# Patient Record
Sex: MALE | URBAN - METROPOLITAN AREA
[De-identification: names, ages, dates, MRNs, and addresses within clinical notes are randomized per-mention and may not be internally consistent; named-entity substitution may affect disease eponyms.]

---

## 2022-06-28 ENCOUNTER — TELEPHONE (OUTPATIENT)
Dept: OTOLARYNGOLOGY | Facility: CLINIC | Age: 67
End: 2022-06-28

## 2022-06-28 RX ORDER — AZELASTINE 1 MG/ML
2 SPRAY, METERED NASAL 2 TIMES DAILY
Qty: 30 ML | Refills: 0 | Status: SHIPPED | OUTPATIENT
Start: 2022-06-28 | End: 2022-06-29

## 2022-06-29 RX ORDER — AZELASTINE 1 MG/ML
2 SPRAY, METERED NASAL 2 TIMES DAILY
Qty: 30 ML | Refills: 0 | Status: SHIPPED | OUTPATIENT
Start: 2022-06-29

## 2022-06-29 NOTE — TELEPHONE ENCOUNTER
Per pt scheduled appt, is aware this is last refill, states pharmacy has not received prescription. Please advise thank you.

## 2022-07-14 ENCOUNTER — OFFICE VISIT (OUTPATIENT)
Dept: OTOLARYNGOLOGY | Facility: CLINIC | Age: 67
End: 2022-07-14
Payer: MEDICARE

## 2022-07-14 VITALS — HEIGHT: 71 IN | BODY MASS INDEX: 30.38 KG/M2 | WEIGHT: 217 LBS

## 2022-07-14 DIAGNOSIS — J34.3 NASAL TURBINATE HYPERTROPHY: ICD-10-CM

## 2022-07-14 DIAGNOSIS — J34.89 NASAL CRUSTING: Primary | ICD-10-CM

## 2022-07-14 DIAGNOSIS — G47.33 OBSTRUCTIVE SLEEP APNEA: ICD-10-CM

## 2022-07-14 PROCEDURE — 99203 OFFICE O/P NEW LOW 30 MIN: CPT | Performed by: SPECIALIST

## 2022-07-14 RX ORDER — AMLODIPINE BESYLATE 10 MG/1
10 TABLET ORAL DAILY
COMMUNITY

## 2022-07-14 RX ORDER — CARVEDILOL 25 MG/1
25 TABLET ORAL 2 TIMES DAILY WITH MEALS
COMMUNITY

## 2022-07-14 RX ORDER — DOXAZOSIN MESYLATE 4 MG/1
4 TABLET ORAL NIGHTLY
COMMUNITY

## 2022-07-14 RX ORDER — PANTOPRAZOLE SODIUM 40 MG/1
40 TABLET, DELAYED RELEASE ORAL
COMMUNITY

## 2022-07-14 RX ORDER — DOXYCYCLINE HYCLATE 100 MG/1
100 CAPSULE ORAL 2 TIMES DAILY
Qty: 28 CAPSULE | Refills: 0 | Status: SHIPPED | OUTPATIENT
Start: 2022-07-14

## 2022-07-14 RX ORDER — ESCITALOPRAM OXALATE 10 MG/1
10 TABLET ORAL DAILY
COMMUNITY

## 2022-07-14 RX ORDER — IMIPRAMINE HCL 25 MG
25 TABLET ORAL NIGHTLY
COMMUNITY

## 2022-07-14 RX ORDER — VALSARTAN 320 MG/1
320 TABLET ORAL DAILY
COMMUNITY

## 2022-07-14 RX ORDER — FESOTERODINE FUMARATE 8 MG/1
8 TABLET, FILM COATED, EXTENDED RELEASE ORAL
COMMUNITY

## 2022-07-14 NOTE — PATIENT INSTRUCTIONS
You had bilateral nasal crusting. Placed you on a trial of doxycycline. Follow-up in 2 weeks time, sooner if problems. Continue the Astelin nasal spray.

## 2022-07-28 ENCOUNTER — OFFICE VISIT (OUTPATIENT)
Dept: OTOLARYNGOLOGY | Facility: CLINIC | Age: 67
End: 2022-07-28
Payer: MEDICARE

## 2022-07-28 VITALS — WEIGHT: 216 LBS | BODY MASS INDEX: 30.24 KG/M2 | HEIGHT: 71 IN

## 2022-07-28 DIAGNOSIS — J34.89 NASAL CRUSTING: Primary | ICD-10-CM

## 2022-07-28 DIAGNOSIS — G47.33 OBSTRUCTIVE SLEEP APNEA: ICD-10-CM

## 2022-07-28 DIAGNOSIS — J34.3 NASAL TURBINATE HYPERTROPHY: ICD-10-CM

## 2022-07-28 PROCEDURE — 99213 OFFICE O/P EST LOW 20 MIN: CPT | Performed by: SPECIALIST

## 2022-07-28 RX ORDER — SOLIFENACIN SUCCINATE 10 MG/1
TABLET, FILM COATED ORAL
COMMUNITY
Start: 2022-05-09

## 2022-07-28 RX ORDER — ESCITALOPRAM OXALATE 5 MG/1
5 TABLET ORAL AS DIRECTED
COMMUNITY

## 2022-07-28 RX ORDER — ASPIRIN 81 MG/1
81 TABLET ORAL DAILY
COMMUNITY

## 2022-07-28 RX ORDER — CARVEDILOL 25 MG/1
12.5 TABLET ORAL 2 TIMES DAILY
COMMUNITY
Start: 2021-08-16

## 2022-07-28 RX ORDER — SODIUM BICARBONATE 325 MG/1
TABLET ORAL AS DIRECTED
COMMUNITY

## 2022-07-28 NOTE — PATIENT INSTRUCTIONS
There infection after the doxycycline. New Astelin nasal spray for nasal congestion. Okay to try dental splint for obstructive sleep apnea.

## 2022-08-11 ENCOUNTER — TELEPHONE (OUTPATIENT)
Dept: OTOLARYNGOLOGY | Facility: CLINIC | Age: 67
End: 2022-08-11

## 2022-08-11 RX ORDER — AZELASTINE 1 MG/ML
2 SPRAY, METERED NASAL 2 TIMES DAILY
Qty: 30 ML | Refills: 5 | Status: SHIPPED | OUTPATIENT
Start: 2022-08-11

## 2022-10-28 ENCOUNTER — OFFICE VISIT (OUTPATIENT)
Dept: OTOLARYNGOLOGY | Facility: CLINIC | Age: 67
End: 2022-10-28
Payer: MEDICARE

## 2022-10-28 VITALS — BODY MASS INDEX: 30.24 KG/M2 | HEIGHT: 71 IN | WEIGHT: 216 LBS

## 2022-10-28 DIAGNOSIS — J34.89 NASAL CRUSTING: Primary | ICD-10-CM

## 2022-10-28 DIAGNOSIS — R09.82 POSTNASAL DRIP: ICD-10-CM

## 2022-10-28 DIAGNOSIS — B37.81 ESOPHAGEAL THRUSH (HCC): ICD-10-CM

## 2022-10-28 PROCEDURE — 99213 OFFICE O/P EST LOW 20 MIN: CPT | Performed by: SPECIALIST

## 2022-10-28 NOTE — PATIENT INSTRUCTIONS
Nasal crusting and very thick mucoid postnasal drainage. I did not want to put you on antibiotics as you just had esophageal candidiasis. I gave you a NeilMed irrigation bottle to irrigate the sinuses please be sure to use only distilled water. I also scribed mupirocin ointment to place to the bilateral anterior nares  Try Mucinex twice daily. Follow-up in 3 weeks time, sooner if problems.

## 2022-11-18 ENCOUNTER — OFFICE VISIT (OUTPATIENT)
Dept: OTOLARYNGOLOGY | Facility: CLINIC | Age: 67
End: 2022-11-18
Payer: MEDICARE

## 2022-11-18 VITALS — WEIGHT: 216 LBS | BODY MASS INDEX: 30.24 KG/M2 | HEIGHT: 71 IN

## 2022-11-18 DIAGNOSIS — B37.81 ESOPHAGEAL THRUSH (HCC): ICD-10-CM

## 2022-11-18 DIAGNOSIS — R68.2 DRY MOUTH: ICD-10-CM

## 2022-11-18 DIAGNOSIS — J04.0 ACUTE LARYNGITIS WITHOUT OBSTRUCTION: Primary | ICD-10-CM

## 2022-11-18 PROCEDURE — 99213 OFFICE O/P EST LOW 20 MIN: CPT | Performed by: SPECIALIST

## 2022-11-18 PROCEDURE — 31575 DIAGNOSTIC LARYNGOSCOPY: CPT | Performed by: SPECIALIST

## 2022-11-18 NOTE — PATIENT INSTRUCTIONS
You had erythema of your bilateral vocal cords. There were no tumors or masses of your vocal cords. I placed you on a trial of Mycostatin oral suspension I would like you to swish gargle and swallow it. Repeat x1 if needed  You can also continue the Biotene for your dry mouth. Follow-up in 3 weeks time, sooner if problems.

## 2022-12-16 ENCOUNTER — OFFICE VISIT (OUTPATIENT)
Dept: OTOLARYNGOLOGY | Facility: CLINIC | Age: 67
End: 2022-12-16
Payer: MEDICARE

## 2022-12-16 VITALS — BODY MASS INDEX: 30.24 KG/M2 | WEIGHT: 216 LBS | HEIGHT: 71 IN

## 2022-12-16 DIAGNOSIS — H60.8X2 CHRONIC ECZEMATOUS OTITIS EXTERNA OF LEFT EAR: ICD-10-CM

## 2022-12-16 DIAGNOSIS — J04.0 ACUTE LARYNGITIS WITHOUT OBSTRUCTION: Primary | ICD-10-CM

## 2022-12-16 RX ORDER — METHYLPREDNISOLONE 4 MG/1
TABLET ORAL
Qty: 21 TABLET | Refills: 0 | Status: SHIPPED | OUTPATIENT
Start: 2022-12-16

## 2022-12-16 NOTE — PATIENT INSTRUCTIONS
You have persistent severe inflammation of your larynx. I placed you on a Medrol Dosepak. Please call or notify me if this is not helpful to you. I also placed you on Valisone cream for the left ear.

## 2022-12-23 ENCOUNTER — TELEPHONE (OUTPATIENT)
Dept: OTOLARYNGOLOGY | Facility: CLINIC | Age: 67
End: 2022-12-23

## 2022-12-23 NOTE — TELEPHONE ENCOUNTER
Pt called stating pt had an appointment on 12-16-22. Pt finished the steroid rx. Pt still has the laryngitis.   Please call pt

## 2022-12-23 NOTE — TELEPHONE ENCOUNTER
Per pt steroid did not help,  Pt denies pain or sore throat, but pt still having voice hoarseness. Please advise, pt asking if any other testing may be needed?

## 2022-12-26 ENCOUNTER — TELEPHONE (OUTPATIENT)
Dept: OTOLARYNGOLOGY | Facility: CLINIC | Age: 67
End: 2022-12-26

## 2022-12-26 RX ORDER — AZITHROMYCIN 250 MG/1
TABLET, FILM COATED ORAL
Qty: 6 TABLET | Refills: 0 | Status: SHIPPED | OUTPATIENT
Start: 2022-12-26

## 2022-12-30 ENCOUNTER — TELEPHONE (OUTPATIENT)
Dept: OTOLARYNGOLOGY | Facility: CLINIC | Age: 67
End: 2022-12-30

## 2022-12-30 NOTE — TELEPHONE ENCOUNTER
Patient indicates the rx azithronycin 250 mg antibiotics given on Monday are working. Patient concerned that his laryngitis may return and asking if it can be extended for another week. Please call at [phone],[phone].

## 2023-01-03 RX ORDER — AZITHROMYCIN 250 MG/1
TABLET, FILM COATED ORAL
Qty: 6 TABLET | Refills: 0 | OUTPATIENT
Start: 2023-01-03

## 2023-01-03 RX ORDER — AZITHROMYCIN 250 MG/1
TABLET, FILM COATED ORAL
Qty: 6 TABLET | Refills: 0 | Status: SHIPPED | OUTPATIENT
Start: 2023-01-03

## 2023-01-03 NOTE — TELEPHONE ENCOUNTER
Per pt calling for refill of azithromycin 250 mg oral tab. Per pt wants to inform Dr. Kodak Herrera that he is currently taking ciprofloxacin 500mg twice daily for 7 days and asking to confirm it is ok to take both antibiotics.  Please advise

## 2023-02-10 ENCOUNTER — OFFICE VISIT (OUTPATIENT)
Dept: OTOLARYNGOLOGY | Facility: CLINIC | Age: 68
End: 2023-02-10

## 2023-02-10 DIAGNOSIS — J32.9 PURULENT POSTNASAL DRAINAGE: ICD-10-CM

## 2023-02-10 DIAGNOSIS — G47.33 OBSTRUCTIVE SLEEP APNEA: ICD-10-CM

## 2023-02-10 DIAGNOSIS — J04.0 ACUTE LARYNGITIS WITHOUT OBSTRUCTION: Primary | ICD-10-CM

## 2023-02-10 PROCEDURE — 99213 OFFICE O/P EST LOW 20 MIN: CPT | Performed by: SPECIALIST

## 2023-02-10 RX ORDER — CEFDINIR 300 MG/1
300 CAPSULE ORAL 2 TIMES DAILY
Qty: 20 CAPSULE | Refills: 0 | Status: SHIPPED | OUTPATIENT
Start: 2023-02-10

## 2023-02-10 NOTE — PATIENT INSTRUCTIONS
I placed you on Omnicef for 10 days for your acute laryngitis and purulent drainage. Follow-up in 2 weeks time, sooner if problems.

## 2023-02-24 ENCOUNTER — OFFICE VISIT (OUTPATIENT)
Dept: OTOLARYNGOLOGY | Facility: CLINIC | Age: 68
End: 2023-02-24

## 2023-02-24 VITALS — HEIGHT: 71 IN | WEIGHT: 216 LBS | BODY MASS INDEX: 30.24 KG/M2

## 2023-02-24 DIAGNOSIS — J04.0 ACUTE LARYNGITIS WITHOUT OBSTRUCTION: ICD-10-CM

## 2023-02-24 DIAGNOSIS — R49.0 HOARSENESS: ICD-10-CM

## 2023-02-24 DIAGNOSIS — R13.12 OROPHARYNGEAL DYSPHAGIA: Primary | ICD-10-CM

## 2023-02-24 PROCEDURE — 31575 DIAGNOSTIC LARYNGOSCOPY: CPT | Performed by: SPECIALIST

## 2023-02-24 PROCEDURE — 99213 OFFICE O/P EST LOW 20 MIN: CPT | Performed by: SPECIALIST

## 2023-02-24 NOTE — PATIENT INSTRUCTIONS
There are many dried and thick secretions seen on fiberoptic exam.  The larynx is still very irritated and erythematous as well. A video swallow was ordered to see if this may be part of a swallowing problem. I will of course notify you of all results. Continue Biotene mouthwash.

## 2023-02-27 RX ORDER — AZELASTINE HYDROCHLORIDE 137 UG/1
SPRAY, METERED NASAL
Qty: 30 ML | Refills: 0 | Status: SHIPPED | OUTPATIENT
Start: 2023-02-27

## 2023-03-19 ENCOUNTER — LAB ENCOUNTER (OUTPATIENT)
Dept: LAB | Facility: HOSPITAL | Age: 68
End: 2023-03-19
Attending: SPECIALIST
Payer: MEDICARE

## 2023-03-19 DIAGNOSIS — R13.12 OROPHARYNGEAL DYSPHAGIA: ICD-10-CM

## 2023-03-19 LAB — SARS-COV-2 RNA RESP QL NAA+PROBE: NOT DETECTED

## 2023-03-22 ENCOUNTER — HOSPITAL ENCOUNTER (OUTPATIENT)
Dept: GENERAL RADIOLOGY | Facility: HOSPITAL | Age: 68
Discharge: HOME | End: 2023-03-22
Attending: SPECIALIST
Payer: MEDICARE

## 2023-03-22 DIAGNOSIS — R13.12 OROPHARYNGEAL DYSPHAGIA: ICD-10-CM

## 2023-03-22 PROCEDURE — 74230 X-RAY XM SWLNG FUNCJ C+: CPT | Performed by: SPECIALIST

## 2023-03-22 PROCEDURE — 92526 ORAL FUNCTION THERAPY: CPT

## 2023-03-22 PROCEDURE — 92611 MOTION FLUOROSCOPY/SWALLOW: CPT

## 2023-03-28 RX ORDER — AZELASTINE HYDROCHLORIDE 137 UG/1
SPRAY, METERED NASAL
Qty: 30 ML | Refills: 0 | Status: SHIPPED | OUTPATIENT
Start: 2023-03-28

## 2023-05-02 RX ORDER — AZELASTINE HYDROCHLORIDE 137 UG/1
SPRAY, METERED NASAL
Qty: 30 ML | Refills: 2 | Status: SHIPPED | OUTPATIENT
Start: 2023-05-02

## 2023-05-22 ENCOUNTER — TELEPHONE (OUTPATIENT)
Dept: OTOLARYNGOLOGY | Facility: CLINIC | Age: 68
End: 2023-05-22

## 2023-05-22 NOTE — TELEPHONE ENCOUNTER
Patient is having same problem from last time visit would like to see Dr Ibrahim Estimable soon . Next available appointment is until June patient refused. Requesting medication. Patient is also loosing his voice.  Please advise

## 2023-05-23 ENCOUNTER — OFFICE VISIT (OUTPATIENT)
Dept: OTOLARYNGOLOGY | Facility: CLINIC | Age: 68
End: 2023-05-23

## 2023-05-23 VITALS — HEIGHT: 71 IN | WEIGHT: 216 LBS | BODY MASS INDEX: 30.24 KG/M2

## 2023-05-23 DIAGNOSIS — R49.0 HOARSENESS: ICD-10-CM

## 2023-05-23 DIAGNOSIS — B96.89 ACUTE BACTERIAL SINUSITIS: Primary | ICD-10-CM

## 2023-05-23 DIAGNOSIS — J01.90 ACUTE BACTERIAL SINUSITIS: Primary | ICD-10-CM

## 2023-05-23 DIAGNOSIS — J32.9 PURULENT POSTNASAL DRAINAGE: ICD-10-CM

## 2023-05-23 PROCEDURE — 31231 NASAL ENDOSCOPY DX: CPT | Performed by: SPECIALIST

## 2023-05-23 PROCEDURE — 99213 OFFICE O/P EST LOW 20 MIN: CPT | Performed by: SPECIALIST

## 2023-05-23 RX ORDER — PREDNISONE 20 MG/1
20 TABLET ORAL DAILY
Qty: 3 TABLET | Refills: 0 | Status: SHIPPED | OUTPATIENT
Start: 2023-05-23

## 2023-05-23 RX ORDER — CEFUROXIME AXETIL 500 MG/1
500 TABLET ORAL 2 TIMES DAILY
Qty: 28 TABLET | Refills: 0 | Status: SHIPPED | OUTPATIENT
Start: 2023-05-23 | End: 2023-06-06

## 2023-05-23 NOTE — PATIENT INSTRUCTIONS
You are placed on Ceftin and 3 days of prednisone for your very severe left greater than right sinusitis. This is causing acute laryngitis as well. Follow-up in 2 weeks time, sooner if problems.

## 2023-06-06 ENCOUNTER — OFFICE VISIT (OUTPATIENT)
Dept: OTOLARYNGOLOGY | Facility: CLINIC | Age: 68
End: 2023-06-06

## 2023-06-06 VITALS — HEIGHT: 71 IN | WEIGHT: 216 LBS | BODY MASS INDEX: 30.24 KG/M2

## 2023-06-06 DIAGNOSIS — R49.0 HOARSENESS: ICD-10-CM

## 2023-06-06 DIAGNOSIS — J01.90 ACUTE BACTERIAL SINUSITIS: Primary | ICD-10-CM

## 2023-06-06 DIAGNOSIS — B96.89 ACUTE BACTERIAL SINUSITIS: Primary | ICD-10-CM

## 2023-06-06 PROCEDURE — 99213 OFFICE O/P EST LOW 20 MIN: CPT | Performed by: SPECIALIST

## 2023-06-06 RX ORDER — CEFUROXIME AXETIL 500 MG/1
500 TABLET ORAL 2 TIMES DAILY
Qty: 28 TABLET | Refills: 0 | Status: SHIPPED | OUTPATIENT
Start: 2023-06-06 | End: 2023-06-20

## 2023-06-06 NOTE — PATIENT INSTRUCTIONS
Finish the [employer]. Follow-up with any additional questions or problems.   I would consider a CT of the sinuses if symptoms persist.

## 2023-07-24 ENCOUNTER — TELEPHONE (OUTPATIENT)
Dept: OTOLARYNGOLOGY | Facility: CLINIC | Age: 68
End: 2023-07-24

## 2023-07-26 ENCOUNTER — TELEPHONE (OUTPATIENT)
Dept: OTOLARYNGOLOGY | Facility: CLINIC | Age: 68
End: 2023-07-26

## 2023-10-26 ENCOUNTER — TELEPHONE (OUTPATIENT)
Dept: OTOLARYNGOLOGY | Facility: CLINIC | Age: 68
End: 2023-10-26

## 2023-10-26 NOTE — TELEPHONE ENCOUNTER
Current Outpatient Medications   Medication Sig Dispense Refill    Azelastine HCl 137 MCG/SPRAY Nasal Solution 2 sprays by Each Nare route 2 (two) times daily.  30 mL 2       Pt states Geeta told him they have been requesting this for a week

## 2023-10-27 RX ORDER — AZELASTINE HYDROCHLORIDE 137 UG/1
2 SPRAY, METERED NASAL 2 TIMES DAILY
Qty: 30 ML | Refills: 2 | Status: SHIPPED | OUTPATIENT
Start: 2023-10-27

## 2023-10-27 NOTE — TELEPHONE ENCOUNTER
Refilled per protocol      Anti-histamine Medications:  Loratadine  Cetirizine  Azelastin  Fexofenadine  Levocetirizine  Corticosteroid Medications:  Fluticasone  Mometasone (Nasonex)  Leukotriene Receptor Antagonist:  Montelukast (Singulair)    Protocol Criteria:  Appointment scheduled in past 12 months  Refill: \"Per Protocol: No Cosign Required\"  Approval of this protocol is dependent on manual look up of last PROVIDER ASSESSMENT AND PLAN and patient compliance with PROVIDER ASSESSMENT AND PLAN follow up recommendations    Permitted Refill Frequency:  1 year (may provide 90 day supply with 3 refills)  Recent Outpatient Visits              4 months ago Acute bacterial sinusitis    Shraddha Carrasco MD    Office Visit    5 months ago Acute bacterial sinusitis    Shraddha Carrasco MD    Office Visit    8 months ago Oropharyngeal dysphagia    [address], Oksana Mo MD    Office Visit    8 months ago Acute laryngitis without obstruction    [address], Oksana Mo MD    Office Visit    10 months ago Acute laryngitis without obstruction    [address], Oksana Mo MD    Office Visit

## 2024-01-17 RX ORDER — AZELASTINE HYDROCHLORIDE 137 UG/1
2 SPRAY, METERED NASAL 2 TIMES DAILY
Qty: 30 ML | Refills: 1 | Status: SHIPPED | OUTPATIENT
Start: 2024-01-17

## 2024-03-19 RX ORDER — AZELASTINE HYDROCHLORIDE 137 UG/1
2 SPRAY, METERED NASAL 2 TIMES DAILY
Qty: 30 ML | Refills: 1 | Status: SHIPPED | OUTPATIENT
Start: 2024-03-19

## 2024-05-14 RX ORDER — AZELASTINE HYDROCHLORIDE 137 UG/1
2 SPRAY, METERED NASAL 2 TIMES DAILY
Qty: 60 ML | Refills: 0 | Status: SHIPPED | OUTPATIENT
Start: 2024-05-14

## 2024-05-14 NOTE — TELEPHONE ENCOUNTER
Refill request sent to pharmacy on 5/14/24 for Azelastine, last OV on 6/6/23. Patient needs an appt for next refill requests.

## 2024-07-05 RX ORDER — AZELASTINE HYDROCHLORIDE 137 UG/1
SPRAY, METERED NASAL
Qty: 30 ML | Refills: 0 | Status: SHIPPED | OUTPATIENT
Start: 2024-07-05

## 2024-07-05 NOTE — TELEPHONE ENCOUNTER
Refill request sent to pharmacy on 7/5/24  for Azelastine, last OV on 6/6/23, will refill 30 days worth, patient needs an appt for future refill requests.

## 2024-08-05 RX ORDER — AZELASTINE HYDROCHLORIDE 137 UG/1
SPRAY, METERED NASAL
Qty: 30 ML | Refills: 0 | OUTPATIENT
Start: 2024-08-05

## 2024-08-05 NOTE — TELEPHONE ENCOUNTER
Pt has scheduled an appointment on 8-23-24.  Can rx. Nasal spray Be refilled.  Send to [facility].  Please call pt

## 2024-08-09 RX ORDER — AZELASTINE HYDROCHLORIDE 137 UG/1
SPRAY, METERED NASAL
Qty: 30 ML | Refills: 0 | Status: SHIPPED | OUTPATIENT
Start: 2024-08-09

## 2024-08-14 ENCOUNTER — TELEPHONE (OUTPATIENT)
Dept: OTOLARYNGOLOGY | Facility: CLINIC | Age: 69
End: 2024-08-14

## 2024-09-27 ENCOUNTER — OFFICE VISIT (OUTPATIENT)
Dept: OTOLARYNGOLOGY | Facility: CLINIC | Age: 69
End: 2024-09-27

## 2024-09-27 VITALS — BODY MASS INDEX: 31.5 KG/M2 | HEIGHT: 71 IN | WEIGHT: 225 LBS

## 2024-09-27 DIAGNOSIS — G47.33 OBSTRUCTIVE SLEEP APNEA: ICD-10-CM

## 2024-09-27 DIAGNOSIS — R68.2 DRY MOUTH: ICD-10-CM

## 2024-09-27 DIAGNOSIS — J34.3 NASAL TURBINATE HYPERTROPHY: Primary | ICD-10-CM

## 2024-09-27 PROCEDURE — 99213 OFFICE O/P EST LOW 20 MIN: CPT | Performed by: SPECIALIST

## 2024-09-27 RX ORDER — GABAPENTIN 100 MG/1
CAPSULE ORAL
COMMUNITY
Start: 2024-09-19

## 2024-09-27 RX ORDER — AZELASTINE HYDROCHLORIDE 137 UG/1
2 SPRAY, METERED NASAL 2 TIMES DAILY
Qty: 30 ML | Refills: 11 | Status: SHIPPED | OUTPATIENT
Start: 2024-09-27

## 2024-09-28 NOTE — PATIENT INSTRUCTIONS
Continue your Astelin nasal spray for your nasal congestion.  Continue Biotene toothpaste and mouthwash for your dry mouth.  Can also add a cool-mist humidifier.  Continue CPAP for your sleep apnea.  Follow-up in 1 year's time, sooner if problems.

## 2024-09-28 NOTE — PROGRESS NOTES
Aidan Garces is a 69 year old male.   Chief Complaint   Patient presents with    Follow - Up     acute bacterial sinusitis    Medication Request     Refill on medication     HPI:   Patient here to get his ears nose and throat checked    Current Outpatient Medications   Medication Sig Dispense Refill    gabapentin 100 MG Oral Cap       azelastine 137 MCG/SPRAY Nasal Solution 2 sprays by Each Nare route in the morning and 2 sprays before bedtime. 30 mL 11    predniSONE 20 MG Oral Tab Take 1 tablet (20 mg total) by mouth daily. 3 tablet 0    cefdinir 300 MG Oral Cap Take 1 capsule (300 mg total) by mouth 2 (two) times daily. 20 capsule 0    azithromycin (ZITHROMAX Z-SHWETA) 250 MG Oral Tab Take 1 by oral route every day for 5 days. 2 tablets today. 6 tablet 0    methylPREDNISolone 4 MG Oral Tablet Therapy Pack Take by oral route. 21 tablet 0    betamethasone 0.1 % External Cream Apply with fingertip to left ear canal at bedtime as needed for itching 15 g 3    NYSTATIN 509720 UNIT/ML Mouth/Throat Suspension TAKE 5 ML BY MOUTH FOUR TIMES A DAY FOR 10 DAYS 200 mL 0    mupirocin 2 % External Ointment Apply with cotton swab to bilateral nares morning and night 15 g 2    aspirin 81 MG Oral Tab EC Take 1 tablet (81 mg total) by mouth daily.      sodium bicarbonate 325 MG Oral Tab Take by mouth As Directed.      escitalopram 5 MG Oral Tab Take 1 tablet (5 mg total) by mouth As Directed.      carvedilol 25 MG Oral Tab Take 0.5 tablets (12.5 mg total) by mouth 2 (two) times daily.      Solifenacin Succinate 10 MG Oral Tab       valsartan 320 MG Oral Tab Take 1 tablet (320 mg total) by mouth daily.      carvedilol 25 MG Oral Tab Take 1 tablet (25 mg total) by mouth 2 (two) times daily with meals.      doxazosin 4 MG Oral Tab Take 1 tablet (4 mg total) by mouth nightly.      Fesoterodine Fumarate ER (TOVIAZ) 8 MG Oral Tablet 24 Hr Take 1 tablet (8 mg total) by mouth.      imipramine 25 MG Oral Tab Take 1 tablet (25 mg total) by  mouth nightly.      pantoprazole 40 MG Oral Tab EC Take 1 tablet (40 mg total) by mouth every morning before breakfast.      escitalopram 10 MG Oral Tab Take 1 tablet (10 mg total) by mouth daily.      amLODIPine 10 MG Oral Tab Take 1 tablet (10 mg total) by mouth daily.      doxycycline 100 MG Oral Cap Take 1 capsule (100 mg total) by mouth 2 (two) times daily. 28 capsule 0      Past Medical History:    Essential hypertension      Social History:  Social History     Socioeconomic History    Marital status:    Tobacco Use    Smoking status: Never    Smokeless tobacco: Never   Vaping Use    Vaping status: Never Used   Substance and Sexual Activity    Alcohol use: Yes     Comment: occasionally    Drug use: Never     Social Determinants of Health      [facility]    Social Connections    Received from [facility]        REVIEW OF SYSTEMS:   GENERAL HEALTH: feels well otherwise  GENERAL : denies fever, chills, sweats, weight loss, weight gain  SKIN: denies any unusual skin lesions or rashes  RESPIRATORY: denies shortness of breath with exertion  NEURO: denies headaches    EXAM:   Ht 5' 11\" (1.803 m)   Wt 225 lb (102.1 kg)   BMI 31.38 kg/m²   System Details   Skin Inspection - Normal.   Constitutional Overall appearance - Normal.   Head/Face Facial features - Normal. Eyebrows - Normal. Skull - Normal.   Eyes Conjunctiva - Right: Normal, Left: Normal. Pupil - Right: Normal, Left: Normal.    Ears Inspection - Right: Normal, Left: Normal.   Canal - Right: Normal, Left: Normal.   TM - Right: Normal, Left: Normal.   Nasal External nose - Normal.   Nasal septum - Normal.  Turbinates -congestion, no purulence or polyps noted   Oral/Oropharynx Lips - Normal, Tonsils -2+ tonsils uvula absent tongue - Normal.  Very dry oral mucosa.   Neck Exam Inspection - Normal. Palpation - Normal. Parotid gland - Normal. Thyroid gland - Normal.   Lymph  Detail Submental. Submandibular. Anterior cervical. Posterior cervical. Supraclavicular all without enlargement   Psychiatric Orientation - Oriented to time, place, person & situation. Appropriate mood and affect.   Neurological Memory - Normal. Cranial nerves - Cranial nerves II through XII grossly intact.     ASSESSMENT AND PLAN:   1. Nasal turbinate hypertrophy  Continue Astelin nasal spray.  Follow-up in 1 years time, sooner if problems.    2. Dry mouth  Using Biotene products can add cool-mist humidifier.    3. Obstructive sleep apnea  On CPAP.      The patient indicates understanding of these issues and agrees to the plan.      Jael Jernigan MD  9/27/2024  9:53 PM

## 2024-12-16 NOTE — TELEPHONE ENCOUNTER
Mikael Ovalle states patient is requesting medication refill.  Please advise       azelastine 0.1 % Nasal Solution Unknown if ever smoked

## 2025-03-15 ENCOUNTER — OFFICE VISIT (OUTPATIENT)
Dept: OTOLARYNGOLOGY | Facility: CLINIC | Age: 70
End: 2025-03-15
Payer: MEDICARE

## 2025-03-15 DIAGNOSIS — K21.9 GASTROESOPHAGEAL REFLUX DISEASE, UNSPECIFIED WHETHER ESOPHAGITIS PRESENT: ICD-10-CM

## 2025-03-15 DIAGNOSIS — J34.3 NASAL TURBINATE HYPERTROPHY: ICD-10-CM

## 2025-03-15 DIAGNOSIS — J34.89 NASAL CRUSTING: Primary | ICD-10-CM

## 2025-03-15 PROCEDURE — 99213 OFFICE O/P EST LOW 20 MIN: CPT | Performed by: SPECIALIST

## 2025-03-15 RX ORDER — CEPHALEXIN 500 MG/1
500 CAPSULE ORAL 2 TIMES DAILY
COMMUNITY
Start: 2025-02-10

## 2025-03-15 RX ORDER — MUPIROCIN 20 MG/G
OINTMENT TOPICAL
Qty: 15 G | Refills: 2 | Status: SHIPPED | OUTPATIENT
Start: 2025-03-15

## 2025-03-16 NOTE — PATIENT INSTRUCTIONS
Finish out your Cipro and I added mupirocin ointment for your nasal crusting.  Continue Astelin nasal spray for the turbinate congestion.  I believe your cough is secondary to your reflux disease.  Continue pantoprazole and you were given an antireflux diet.

## 2025-03-16 NOTE — PROGRESS NOTES
Aidan Garces is a 69 year old male.   Chief Complaint   Patient presents with    Nose Problem    Cough     Pt in for sinus issues, has been for 1 month, feels some nasal congestion, has a dry cough- has a nasal spray,     HPI:   Patient here for nasal obstruction and a cough.    Current Outpatient Medications   Medication Sig Dispense Refill    cephALEXin 500 MG Oral Cap Take 1 capsule (500 mg total) by mouth 2 (two) times daily.      mupirocin 2 % External Ointment Apply with cotton swab to bilateral nares twice daily for crusting. 15 g 2    gabapentin 100 MG Oral Cap       azelastine 137 MCG/SPRAY Nasal Solution 2 sprays by Each Nare route in the morning and 2 sprays before bedtime. 30 mL 11    betamethasone 0.1 % External Cream Apply with fingertip to left ear canal at bedtime as needed for itching 15 g 3    NYSTATIN 961695 UNIT/ML Mouth/Throat Suspension TAKE 5 ML BY MOUTH FOUR TIMES A DAY FOR 10 DAYS 200 mL 0    mupirocin 2 % External Ointment Apply with cotton swab to bilateral nares morning and night 15 g 2    aspirin 81 MG Oral Tab EC Take 1 tablet (81 mg total) by mouth daily.      sodium bicarbonate 325 MG Oral Tab Take by mouth As Directed.      escitalopram 5 MG Oral Tab Take 1 tablet (5 mg total) by mouth As Directed.      carvedilol 25 MG Oral Tab Take 0.5 tablets (12.5 mg total) by mouth 2 (two) times daily.      Solifenacin Succinate 10 MG Oral Tab       valsartan 320 MG Oral Tab Take 1 tablet (320 mg total) by mouth daily.      carvedilol 25 MG Oral Tab Take 1 tablet (25 mg total) by mouth 2 (two) times daily with meals.      doxazosin 4 MG Oral Tab Take 1 tablet (4 mg total) by mouth nightly.      Fesoterodine Fumarate ER (TOVIAZ) 8 MG Oral Tablet 24 Hr Take 1 tablet (8 mg total) by mouth.      imipramine 25 MG Oral Tab Take 1 tablet (25 mg total) by mouth nightly.      pantoprazole 40 MG Oral Tab EC Take 1 tablet (40 mg total) by mouth every morning before breakfast.      escitalopram 10 MG Oral  Tab Take 1 tablet (10 mg total) by mouth daily.      amLODIPine 10 MG Oral Tab Take 1 tablet (10 mg total) by mouth daily.      doxycycline 100 MG Oral Cap Take 1 capsule (100 mg total) by mouth 2 (two) times daily. 28 capsule 0    predniSONE 20 MG Oral Tab Take 1 tablet (20 mg total) by mouth daily. (Patient not taking: Reported on 3/15/2025) 3 tablet 0      Past Medical History:    Essential hypertension      Social History:  Social History     Socioeconomic History    Marital status:    Tobacco Use    Smoking status: Never    Smokeless tobacco: Never   Vaping Use    Vaping status: Never Used   Substance and Sexual Activity    Alcohol use: Yes     Comment: occasionally    Drug use: Never     Social Drivers of Health      Received from [facility]        REVIEW OF SYSTEMS:   GENERAL HEALTH: feels well otherwise  GENERAL : denies fever, chills, sweats, weight loss, weight gain  SKIN: denies any unusual skin lesions or rashes  RESPIRATORY: denies shortness of breath with exertion  NEURO: denies headaches    EXAM:   There were no vitals taken for this visit.  System Details   Skin Inspection - Normal.   Constitutional Overall appearance - Normal.   Head/Face Facial features - Normal. Eyebrows - Normal. Skull - Normal.   Eyes Conjunctiva - Right: Normal, Left: Normal. Pupil - Right: Normal, Left: Normal.    Ears Inspection - Right: Normal, Left: Normal.   Canal - Right: Normal, Left: Normal.   TM - Right: Normal, Left: Normal.   Nasal External nose - Normal.   Nasal septum - Normal.  Turbinates -congested turbinates and nasal crusting bilaterally.   Oral/Oropharynx Lips - Normal, Tonsils - Normal.  Absent uvula.  Tongue - Normal    Neck Exam Inspection - Normal. Palpation - Normal. Parotid gland - Normal. Thyroid gland - Normal.   Lymph Detail Submental. Submandibular. Anterior cervical. Posterior cervical. Supraclavicular all without enlargement   Larynx Slight posterior laryngeal Erex edema by  mirror examination.  Normal vocal cord mobility.  No tumors or masses seen.  No retained secretions.   Lungs Clear to auscultation.   Psychiatric Orientation - Oriented to time, place, person & situation. Appropriate mood and affect.   Neurological Memory - Normal. Cranial nerves - Cranial nerves II through XII grossly intact.     ASSESSMENT AND PLAN:   1. Nasal crusting  Finish oral Cipro that you were placed on for your urinary tract infection.  Add mupirocin ointment.    2. Gastroesophageal reflux disease, unspecified whether esophagitis present  Continue pantoprazole.  Patient given an antireflux diet.    3. Nasal turbinate hypertrophy  Continue Astelin nasal spray.      The patient indicates understanding of these issues and agrees to the plan.      Jael Jernigan MD  3/15/2025  8:45 PM

## 2025-05-27 ENCOUNTER — TELEPHONE (OUTPATIENT)
Dept: OTOLARYNGOLOGY | Facility: CLINIC | Age: 70
End: 2025-05-27

## 2025-05-27 NOTE — TELEPHONE ENCOUNTER
Dr. Jernigan - Please see information below and advise. Thank you.     Patient called the office with c/o laryngitis, cough and scratchy throat.     Follow up question for MD:  - Did you want to see him at 0850 or 0950 on Thursday 5/29/25?    Current symptoms:   - laryngitis  - cough  - sore throat  -     Pertinent Medications/Last Administration:   - Cough medicine  - allergy medication  - Afrin      Patient Story: (Detail why pt is calling)  Patient saw Dr. Jernigan on 3/15/25 for cough and nasal crusting, he claims he has laryngitis that started 4-5 days ago, he said this happens to him every spring.  He went to a \"Doctor in a box\" and claims wasn't given anything to help him.  He wants to see Dr. Jernigan Thursday if possible, he can't do Friday due to work.

## 2025-05-27 NOTE — TELEPHONE ENCOUNTER
Per patient calling stating his laryngitis has gotten worse- per patient asking if Dr. Jernigan would be able to see him anytime before 5/30 at any of her locations. Per patient states he would like to stick to Dr. Jernigan. Please call back.

## 2025-05-29 ENCOUNTER — OFFICE VISIT (OUTPATIENT)
Dept: OTOLARYNGOLOGY | Facility: CLINIC | Age: 70
End: 2025-05-29
Payer: MEDICARE

## 2025-05-29 DIAGNOSIS — R05.1 ACUTE COUGH: ICD-10-CM

## 2025-05-29 DIAGNOSIS — B96.89 ACUTE BACTERIAL SINUSITIS: Primary | ICD-10-CM

## 2025-05-29 DIAGNOSIS — J01.90 ACUTE BACTERIAL SINUSITIS: Primary | ICD-10-CM

## 2025-05-29 DIAGNOSIS — R49.0 HOARSENESS: ICD-10-CM

## 2025-05-29 PROCEDURE — 99213 OFFICE O/P EST LOW 20 MIN: CPT | Performed by: SPECIALIST

## 2025-05-29 RX ORDER — CODEINE PHOSPHATE AND GUAIFENESIN 10; 100 MG/5ML; MG/5ML
5 SOLUTION ORAL EVERY 6 HOURS PRN
Qty: 200 ML | Refills: 0 | Status: SHIPPED
Start: 2025-05-29

## 2025-05-30 NOTE — PROGRESS NOTES
Aidan Garces is a 69 year old male.   Chief Complaint   Patient presents with    Voice Problem     Hoarseness and chronic cough x1 month     HPI:   Patient here for a cough for 1 month as well as hoarseness and nasal obstruction    Current Medications[1]   Past Medical History[2]   Social History:  Short Social Hx on File[3]     REVIEW OF SYSTEMS:   GENERAL HEALTH: feels well otherwise  GENERAL : denies fever, chills, sweats, weight loss, weight gain  SKIN: denies any unusual skin lesions or rashes  RESPIRATORY: denies shortness of breath with exertion  NEURO: denies headaches    EXAM:   There were no vitals taken for this visit.  System Details   Skin Inspection - Normal.   Constitutional Overall appearance - Normal.   Head/Face Facial features - Normal. Eyebrows - Normal. Skull - Normal.   Eyes Conjunctiva - Right: Normal, Left: Normal. Pupil - Right: Normal, Left: Normal.    Ears Inspection - Right: Normal, Left: Normal.   Canal - Right: Normal, Left: Normal.    TM - Right: Normal, Left: Normal.   Nasal External nose - Normal.   Nasal septum - Normal.  Turbinates - Normal heavy yellow purulent left nasal drainage.  Culture was taken on the left.   Oral/Oropharynx Lips - Normal, Tonsils - Normal, Tongue - Normal.  Absent uvula.  Heavy yellow purulent drainage in the left oropharynx.   Neck Exam Inspection - Normal. Palpation - Normal. Parotid gland - Normal. Thyroid gland - Normal.   Lymph Detail Submental. Submandibular. Anterior cervical. Posterior cervical. Supraclavicular.  All without enlargement   Psychiatric Orientation - Oriented to time, place, person & situation. Appropriate mood and affect.   Neurological Memory - Normal. Cranial nerves - Cranial nerves II through XII grossly intact.     ASSESSMENT AND PLAN:   1. Acute bacterial sinusitis  Patient placed on Augmentin.  A culture was taken and antibiotics will be changed if necessary.  - Aerobic Bacterial Culture; Future  - Aerobic Bacterial  Culture    2. Acute cough  Patient placed on Virtussin AC.  - guaiFENesin-codeine (VIRTUSSIN A/C) 100-10 MG/5ML Oral Solution; Take 5 mL by mouth every 6 (six) hours as needed for cough.  Dispense: 200 mL; Refill: 0    3. Hoarseness  Will recommend a fiberoptic scope if symptoms do not resolve with the above treatment.  Patient to follow-up in 3 weeks time, sooner if problems.      The patient indicates understanding of these issues and agrees to the plan.      Jael Jernigan MD  5/29/2025  7:00 PM       [1]   Current Outpatient Medications   Medication Sig Dispense Refill    amoxicillin clavulanate 875-125 MG Oral Tab Take 1 tablet by mouth every 12 (twelve) hours. 28 tablet 0    guaiFENesin-codeine (VIRTUSSIN A/C) 100-10 MG/5ML Oral Solution Take 5 mL by mouth every 6 (six) hours as needed for cough. 200 mL 0    cephALEXin 500 MG Oral Cap Take 1 capsule (500 mg total) by mouth 2 (two) times daily.      mupirocin 2 % External Ointment Apply with cotton swab to bilateral nares twice daily for crusting. 15 g 2    gabapentin 100 MG Oral Cap       azelastine 137 MCG/SPRAY Nasal Solution 2 sprays by Each Nare route in the morning and 2 sprays before bedtime. 30 mL 11    predniSONE 20 MG Oral Tab Take 1 tablet (20 mg total) by mouth daily. (Patient not taking: Reported on 3/15/2025) 3 tablet 0    betamethasone 0.1 % External Cream Apply with fingertip to left ear canal at bedtime as needed for itching 15 g 3    NYSTATIN [id number] UNIT/ML Mouth/Throat Suspension TAKE 5 ML BY MOUTH FOUR TIMES A DAY FOR 10 DAYS 200 mL 0    mupirocin 2 % External Ointment Apply with cotton swab to bilateral nares morning and night 15 g 2    aspirin 81 MG Oral Tab EC Take 1 tablet (81 mg total) by mouth daily.      sodium bicarbonate 325 MG Oral Tab Take by mouth As Directed.      escitalopram 5 MG Oral Tab Take 1 tablet (5 mg total) by mouth As Directed.      carvedilol 25 MG Oral Tab Take 0.5 tablets (12.5 mg total) by mouth 2 (two) times  daily.      Solifenacin Succinate 10 MG Oral Tab       valsartan 320 MG Oral Tab Take 1 tablet (320 mg total) by mouth daily.      carvedilol 25 MG Oral Tab Take 1 tablet (25 mg total) by mouth 2 (two) times daily with meals.      doxazosin 4 MG Oral Tab Take 1 tablet (4 mg total) by mouth nightly.      Fesoterodine Fumarate ER (TOVIAZ) 8 MG Oral Tablet 24 Hr Take 1 tablet (8 mg total) by mouth.      imipramine 25 MG Oral Tab Take 1 tablet (25 mg total) by mouth nightly.      pantoprazole 40 MG Oral Tab EC Take 1 tablet (40 mg total) by mouth every morning before breakfast.      escitalopram 10 MG Oral Tab Take 1 tablet (10 mg total) by mouth daily.      amLODIPine 10 MG Oral Tab Take 1 tablet (10 mg total) by mouth daily.      doxycycline 100 MG Oral Cap Take 1 capsule (100 mg total) by mouth 2 (two) times daily. 28 capsule 0   [2]   Past Medical History:   Essential hypertension   [3]   Social History  Socioeconomic History    Marital status:    Tobacco Use    Smoking status: Never    Smokeless tobacco: Never   Vaping Use    Vaping status: Never Used   Substance and Sexual Activity    Alcohol use: Yes     Comment: occasionally    Drug use: Never     Social Drivers of Health      [email or web address]

## 2025-05-30 NOTE — PATIENT INSTRUCTIONS
You were placed on Augmentin and Virtussin AC for your left sinusitis, cough, and postnasal drainage.  Culture was taken on the left.  I will of course notify you of the results and change antibiotics if needed.  Follow-up in 3 weeks time, sooner if problems.  A fiberoptic scope will be recommended if your hoarseness persist.

## 2025-06-01 ENCOUNTER — TELEPHONE (OUTPATIENT)
Dept: OTOLARYNGOLOGY | Facility: CLINIC | Age: 70
End: 2025-06-01

## 2025-06-01 RX ORDER — LEVOFLOXACIN 500 MG/1
500 TABLET, FILM COATED ORAL EVERY 24 HOURS
Qty: 10 TABLET | Refills: 0 | Status: SHIPPED | OUTPATIENT
Start: 2025-06-01

## 2025-06-27 ENCOUNTER — OFFICE VISIT (OUTPATIENT)
Dept: OTOLARYNGOLOGY | Facility: CLINIC | Age: 70
End: 2025-06-27

## 2025-06-27 DIAGNOSIS — J01.90 ACUTE BACTERIAL SINUSITIS: Primary | ICD-10-CM

## 2025-06-27 DIAGNOSIS — R49.0 HOARSENESS: ICD-10-CM

## 2025-06-27 DIAGNOSIS — R09.82 POSTNASAL DRIP: ICD-10-CM

## 2025-06-27 DIAGNOSIS — B96.89 ACUTE BACTERIAL SINUSITIS: Primary | ICD-10-CM

## 2025-06-27 PROCEDURE — 99213 OFFICE O/P EST LOW 20 MIN: CPT | Performed by: SPECIALIST

## 2025-06-27 NOTE — PROGRESS NOTES
Aidan Garces is a 69 year old male.   Chief Complaint   Patient presents with    Follow - Up     acute bacterial sinusitis     HPI:   Patient on Levaquin for a Pseudomonas sinusitis.  He also had an acute laryngitis.  He initially took one of the 500 mg tablets but had to change to the 250 mg due to nausea.    Current Medications[1]   Past Medical History[2]   Social History:  Short Social Hx on File[3]     REVIEW OF SYSTEMS:   GENERAL HEALTH: feels well otherwise  GENERAL : denies fever, chills, sweats, weight loss, weight gain  SKIN: denies any unusual skin lesions or rashes  RESPIRATORY: denies shortness of breath with exertion  NEURO: denies headaches    EXAM:   There were no vitals taken for this visit.  System Details   Skin Inspection - Normal.   Constitutional Overall appearance - Normal.   Head/Face Facial features - Normal. Eyebrows - Normal. Skull - Normal.   Eyes Conjunctiva - Right: Normal, Left: Normal. Pupil - Right: Normal, Left: Normal.    Ears Inspection - Right: Normal, Left: Normal.   Canal - Right: Normal, Left: Normal.   TM - Right: Normal, Left: Normal.   Nasal External nose - Normal.   Nasal septum - Normal.  Turbinates -congestion, no purulence by speculum examination   Oral/Oropharynx Lips - Normal, Tonsils -2-3+ tonsils some drainage.  Tongue - Normal    Neck Exam Inspection - Normal. Palpation - Normal. Parotid gland - Normal. Thyroid gland - Normal.   Lymph Detail Submental. Submandibular. Anterior cervical. Posterior cervical. Supraclavicular all without enlargement   Larynx Mirror examination with a large amount of white mucus.  Slight erythema of the vocal cords.  No tumors or masses seen.  Normal vocal cord mobility.   Psychiatric Orientation - Oriented to time, place, person & situation. Appropriate mood and affect.   Neurological Memory - Normal. Cranial nerves - Cranial nerves II through XII grossly intact.     ASSESSMENT AND PLAN:   1. Acute bacterial sinusitis  Seems resolved  but still a large amount of mucoid postnasal drip.  Patient to try a NeilMed irrigation.    2. Hoarseness  Slight erythema and mucus.  Patient to try irrigation for the postnasal drip.    3. Postnasal drip  As above.  Looks clear after the Levaquin.  Patient to call me in about 2 weeks time to let me know if this is improving with the irrigation.  Continue Astelin nasal spray for the postnasal drip.      The patient indicates understanding of these issues and agrees to the plan.      Jael Jernigan MD  6/27/2025  12:42 PM       [1]   Current Outpatient Medications   Medication Sig Dispense Refill    guaiFENesin-codeine (VIRTUSSIN A/C) 100-10 MG/5ML Oral Solution Take 5 mL by mouth every 6 (six) hours as needed for cough. 200 mL 0    mupirocin 2 % External Ointment Apply with cotton swab to bilateral nares twice daily for crusting. 15 g 2    gabapentin 100 MG Oral Cap       azelastine 137 MCG/SPRAY Nasal Solution 2 sprays by Each Nare route in the morning and 2 sprays before bedtime. 30 mL 11    betamethasone 0.1 % External Cream Apply with fingertip to left ear canal at bedtime as needed for itching 15 g 3    NYSTATIN 521682 UNIT/ML Mouth/Throat Suspension TAKE 5 ML BY MOUTH FOUR TIMES A DAY FOR 10 DAYS 200 mL 0    mupirocin 2 % External Ointment Apply with cotton swab to bilateral nares morning and night 15 g 2    aspirin 81 MG Oral Tab EC Take 1 tablet (81 mg total) by mouth daily.      sodium bicarbonate 325 MG Oral Tab Take by mouth As Directed.      escitalopram 5 MG Oral Tab Take 1 tablet (5 mg total) by mouth As Directed.      carvedilol 25 MG Oral Tab Take 0.5 tablets (12.5 mg total) by mouth 2 (two) times daily.      Solifenacin Succinate 10 MG Oral Tab       valsartan 320 MG Oral Tab Take 1 tablet (320 mg total) by mouth daily.      carvedilol 25 MG Oral Tab Take 1 tablet (25 mg total) by mouth 2 (two) times daily with meals.      doxazosin 4 MG Oral Tab Take 1 tablet (4 mg total) by mouth nightly.       Fesoterodine Fumarate ER (TOVIAZ) 8 MG Oral Tablet 24 Hr Take 1 tablet (8 mg total) by mouth.      imipramine 25 MG Oral Tab Take 1 tablet (25 mg total) by mouth nightly.      pantoprazole 40 MG Oral Tab EC Take 1 tablet (40 mg total) by mouth every morning before breakfast.      escitalopram 10 MG Oral Tab Take 1 tablet (10 mg total) by mouth daily.      amLODIPine 10 MG Oral Tab Take 1 tablet (10 mg total) by mouth daily.      levoFLOXacin 500 MG Oral Tab Take 1 tablet (500 mg total) by mouth daily. (Patient not taking: Reported on 6/27/2025) 10 tablet 0    amoxicillin clavulanate 875-125 MG Oral Tab Take 1 tablet by mouth every 12 (twelve) hours. (Patient not taking: Reported on 6/27/2025) 28 tablet 0    cephALEXin 500 MG Oral Cap Take 1 capsule (500 mg total) by mouth 2 (two) times daily. (Patient not taking: Reported on 6/27/2025)      predniSONE 20 MG Oral Tab Take 1 tablet (20 mg total) by mouth daily. (Patient not taking: Reported on 6/27/2025) 3 tablet 0    doxycycline 100 MG Oral Cap Take 1 capsule (100 mg total) by mouth 2 (two) times daily. (Patient not taking: Reported on 6/27/2025) 28 capsule 0   [2]   Past Medical History:   Essential hypertension   [3]   Social History  Socioeconomic History    Marital status:    Tobacco Use    Smoking status: Never    Smokeless tobacco: Never   Vaping Use    Vaping status: Never Used   Substance and Sexual Activity    Alcohol use: Yes     Comment: occasionally    Drug use: Never     Social Drivers of Health      Received from [facility]

## 2025-06-27 NOTE — PATIENT INSTRUCTIONS
Please try the NeilMed irrigation bottle to get rid of some of the mucus.  Call me if this does not improve your symptoms.  No further purulence seen after the Levaquin.  Continue Astelin nasal spray for the postnasal drip.  Call me in 2 weeks time to let me know what your progress is.